# Patient Record
Sex: MALE | Race: WHITE | NOT HISPANIC OR LATINO | Employment: FULL TIME | URBAN - NONMETROPOLITAN AREA
[De-identification: names, ages, dates, MRNs, and addresses within clinical notes are randomized per-mention and may not be internally consistent; named-entity substitution may affect disease eponyms.]

---

## 2023-04-19 ENCOUNTER — APPOINTMENT (EMERGENCY)
Dept: RADIOLOGY | Facility: HOSPITAL | Age: 53
End: 2023-04-19

## 2023-04-19 ENCOUNTER — HOSPITAL ENCOUNTER (EMERGENCY)
Facility: HOSPITAL | Age: 53
Discharge: HOME | End: 2023-04-19
Attending: EMERGENCY MEDICINE | Admitting: EMERGENCY MEDICINE

## 2023-04-19 VITALS
RESPIRATION RATE: 16 BRPM | DIASTOLIC BLOOD PRESSURE: 85 MMHG | SYSTOLIC BLOOD PRESSURE: 129 MMHG | OXYGEN SATURATION: 98 % | WEIGHT: 150 LBS | HEART RATE: 90 BPM | TEMPERATURE: 98 F

## 2023-04-19 DIAGNOSIS — S39.012A STRAIN OF LUMBAR REGION, INITIAL ENCOUNTER: Primary | ICD-10-CM

## 2023-04-19 DIAGNOSIS — M25.462 SWELLING OF JOINT OF LEFT KNEE: ICD-10-CM

## 2023-04-19 RX ORDER — PREDNISONE 20 MG/1
40 TABLET ORAL DAILY
Qty: 8 TABLET | Refills: 0 | Status: SHIPPED | OUTPATIENT
Start: 2023-04-19 | End: 2023-04-23

## 2023-04-19 RX ORDER — PREDNISONE 20 MG/1
40 TABLET ORAL ONCE
Status: COMPLETED | OUTPATIENT
Start: 2023-04-19 | End: 2023-04-19

## 2023-04-19 RX ORDER — LIDOCAINE 50 MG/G
1 PATCH TOPICAL ONCE
Status: DISCONTINUED | OUTPATIENT
Start: 2023-04-19 | End: 2023-04-19 | Stop reason: HOSPADM

## 2023-04-19 RX ORDER — NAPROXEN 500 MG/1
500 TABLET ORAL 2 TIMES DAILY WITH MEALS
Qty: 10 TABLET | Refills: 0 | Status: SHIPPED | OUTPATIENT
Start: 2023-04-19 | End: 2023-04-24

## 2023-04-19 RX ORDER — LIDOCAINE 50 MG/G
1 PATCH TOPICAL DAILY
Qty: 6 PATCH | Refills: 0 | Status: SHIPPED | OUTPATIENT
Start: 2023-04-19 | End: 2023-04-25

## 2023-04-19 RX ORDER — KETOROLAC TROMETHAMINE 30 MG/ML
15 INJECTION, SOLUTION INTRAMUSCULAR; INTRAVENOUS ONCE
Status: COMPLETED | OUTPATIENT
Start: 2023-04-19 | End: 2023-04-19

## 2023-04-19 RX ADMIN — LIDOCAINE 5% 1 PATCH: 700 PATCH TOPICAL at 11:16

## 2023-04-19 RX ADMIN — PREDNISONE 40 MG: 20 TABLET ORAL at 11:16

## 2023-04-19 RX ADMIN — KETOROLAC TROMETHAMINE 15 MG: 30 INJECTION, SOLUTION INTRAMUSCULAR; INTRAVENOUS at 11:17

## 2023-04-19 NOTE — DISCHARGE INSTRUCTIONS
Please avoid any heavy lifting, twisting or turning movements  Follow-up with our sports medicine team, referral has been placed for you  Rest and use ice on your back and knee as needed    Return with any new or worsening symptoms

## 2023-04-19 NOTE — ED PROVIDER NOTES
"History  Chief Complaint   Patient presents with   • Back Pain     Pt reports lower back pain after setting up aquarium for his daughter this weekend  Reports L knee pain at this time as well  No known injury      61-year-old male presents the emergency department for evaluation of back pain  Patient states this started over the weekend when he was lifting a large log to place it in his equal on his aquarium  Reports he felt his back strain while doing this  Since reports this feels like his \"sciatic pain  \"  Which he reports he has had multiple flareups over the past several years  He denies symptoms including fevers, chills, loss of bowel or bladder control, IV drug use, saddle paresthesias or rectal numbness  He denies any urinary symptoms  Pain does not radiate down the legs  Patient also reports left knee swelling  Denies injury to the knee  Reports he might be compensating for the back pain and walking abnormally  Patient states he does not have much pain in the knee but reports the knee as been swollen since Monday  Denies redness or warmth  Denies fevers  History provided by:  Patient  Back Pain  Location:  Lumbar spine and gluteal region  Quality:  Shooting  Pain severity:  Moderate  Onset quality:  Sudden  Timing:  Constant  Progression:  Unchanged  Chronicity:  New  Context: lifting heavy objects    Context comment:  Bending over  Relieved by:  Being still  Worsened by: Movement  Associated symptoms: no abdominal pain, no abdominal swelling, no bladder incontinence, no bowel incontinence, no chest pain, no dysuria, no fever, no headaches, no leg pain, no numbness, no paresthesias, no perianal numbness, no tingling, no weakness and no weight loss    Knee Pain  Associated symptoms: back pain    Associated symptoms: no fever        None       No past medical history on file  No past surgical history on file  No family history on file    I have reviewed and agree with the history as " documented  E-Cigarette/Vaping     E-Cigarette/Vaping Substances     Social History     Tobacco Use   • Smoking status: Every Day     Packs/day: 0 50     Types: Cigarettes   • Smokeless tobacco: Never   Substance Use Topics   • Alcohol use: Not Currently   • Drug use: Not Currently       Review of Systems   Constitutional: Negative for fever and weight loss  Respiratory: Negative  Cardiovascular: Negative  Negative for chest pain  Gastrointestinal: Negative for abdominal pain and bowel incontinence  Genitourinary: Negative for bladder incontinence and dysuria  Musculoskeletal: Positive for arthralgias, back pain and joint swelling  Skin: Negative  Neurological: Negative for tingling, weakness, numbness, headaches and paresthesias  All other systems reviewed and are negative  Physical Exam  Physical Exam  Vitals and nursing note reviewed  Constitutional:       General: He is not in acute distress  Appearance: Normal appearance  He is not ill-appearing, toxic-appearing or diaphoretic  HENT:      Head: Normocephalic  Eyes:      Conjunctiva/sclera: Conjunctivae normal    Abdominal:      Tenderness: There is no right CVA tenderness or left CVA tenderness  Musculoskeletal:      Cervical back: Normal       Thoracic back: Normal         Back:       Left knee: Swelling present  No deformity, erythema, ecchymosis or bony tenderness  Normal range of motion  No tenderness  Comments: paraspinal lumbar and gluteal tenderness  No specific point tenderness or midline tenderness  No step-off deformities  No overlying skin abnormalities   Skin:     General: Skin is warm and dry  Findings: No bruising, erythema or rash  Neurological:      General: No focal deficit present  Mental Status: He is alert  GCS: GCS eye subscore is 4  GCS verbal subscore is 5  GCS motor subscore is 6  Sensory: Sensation is intact  No sensory deficit        Deep Tendon Reflexes:      Reflex Scores:       Patellar reflexes are 2+ on the right side and 2+ on the left side  Psychiatric:         Mood and Affect: Mood normal          Vital Signs  ED Triage Vitals [04/19/23 1052]   Temperature Pulse Respirations Blood Pressure SpO2   98 °F (36 7 °C) 90 16 129/85 98 %      Temp Source Heart Rate Source Patient Position - Orthostatic VS BP Location FiO2 (%)   Temporal -- Lying Right arm --      Pain Score       5           Vitals:    04/19/23 1052   BP: 129/85   Pulse: 90   Patient Position - Orthostatic VS: Lying         Visual Acuity  Visual Acuity    Flowsheet Row Most Recent Value   L Pupil Size (mm) 3   R Pupil Size (mm) 3          ED Medications  Medications   lidocaine (LIDODERM) 5 % patch 1 patch (1 patch Topical Medication Applied 4/19/23 1116)   ketorolac (TORADOL) injection 15 mg (15 mg Intramuscular Given 4/19/23 1117)   predniSONE tablet 40 mg (40 mg Oral Given 4/19/23 1116)       Diagnostic Studies  Results Reviewed     None                 XR knee 1 or 2 views left   Final Result by Jj Giraldo MD (04/19 1209)      No acute osseous abnormality  Workstation performed: [id number]                    Procedures  Procedures         ED Course                               SBIRT 20yo+    Flowsheet Row Most Recent Value   Initial Alcohol Screen: US AUDIT-C     1  How often do you have a drink containing alcohol? 0 Filed at: 04/19/2023 1056   2  How many drinks containing alcohol do you have on a typical day you are drinking? 0 Filed at: 04/19/2023 1056   3a  Male UNDER 65: How often do you have five or more drinks on one occasion? 0 Filed at: 04/19/2023 1056   Audit-C Score 0 Filed at: 04/19/2023 1056   REINALDO: How many times in the past year have you    Used an illegal drug or used a prescription medication for non-medical reasons?  Never Filed at: 04/19/2023 1056                    Medical Decision Making  82-year-old male presented to the emergency department for evaluation of low back pain status post lifting heavy object and bending  Patient also reports left knee swelling  Vitals and medical record reviewed  Differential diagnosis included but not limited to sciatica, back spasm, back strain, fracture, ligament injury, septic joint  On exam patient had paralumbar back tenderness  No overlying skin abnormalities  No midline or point tenderness  Step-off deformities  History and physical exam were consistent with sciatica and patient was treated as such  Patient's left knee was swollen  There is no redness or warmth  Patient is afebrile  Low concern for septic joint  Normal range of motion  ED interpretation of x-ray was negative for acute osseous injury  Concern for fracture  Treated with an Ace wrap and will follow-up with sports medicine  Return precautions were discussed and he verbalized understanding  Patient was clinically hemodynamically stable for discharge    Strain of lumbar region, initial encounter: acute illness or injury  Swelling of joint of left knee: acute illness or injury  Amount and/or Complexity of Data Reviewed  Radiology: ordered  Risk  Prescription drug management  Disposition  Final diagnoses:   Strain of lumbar region, initial encounter   Swelling of joint of left knee     Time reflects when diagnosis was documented in both MDM as applicable and the Disposition within this note     Time User Action Codes Description Comment    4/19/2023 11:49 AM Norma Sandoval Add [S39 012A] Strain of lumbar region, initial encounter     4/19/2023 11:49 AM Norma Sandoval Add [M25 462] Swelling of joint of left knee       ED Disposition     ED Disposition   Discharge    Condition   Stable    Date/Time   Wed Apr 19, 2023 11:51 AM    Comment   Santos Regalado discharge to home/self care                 Follow-up Information     Follow up With Specialties Details [address], DO    [address] [facility] [address] [address] [address]  [address]  [address] [place] [address]  [phone]            Discharge Medication List as of 4/19/2023 11:51 AM      START taking these medications    Details   lidocaine (Lidoderm) 5 % Apply 1 patch topically over 12 hours daily for 6 days Remove & Discard patch within 12 hours or as directed by MD, Starting Wed 4/19/2023, Until Tue 4/25/2023, Normal      naproxen (Naprosyn) 500 mg tablet Take 1 tablet (500 mg total) by mouth 2 (two) times a day with meals for 5 days, Starting Wed 4/19/2023, Until Mon 4/24/2023, Normal      predniSONE 20 mg tablet Take 2 tablets (40 mg total) by mouth daily for 4 days, Starting Wed 4/19/2023, Until Sun 4/23/2023, Normal                 PDMP Review     None          ED Provider  Electronically Signed by           Caleb Silva PA-C  04/19/23 3409

## 2023-04-19 NOTE — Clinical Note
Rocaeltracie Navarro was seen and treated in our emergency department on 4/19/2023  Diagnosis:     Glenna Vyas  may return to work on return date  He may return on this date: 04/21/2023         If you have any questions or concerns, please don't hesitate to call        Gissel Sal PA-C    ______________________________           _______________          _______________  Hospital Representative                              Date                                Time

## 2023-04-21 VITALS
SYSTOLIC BLOOD PRESSURE: 122 MMHG | DIASTOLIC BLOOD PRESSURE: 85 MMHG | HEIGHT: 70 IN | HEART RATE: 83 BPM | TEMPERATURE: 97.6 F | BODY MASS INDEX: 19.9 KG/M2 | WEIGHT: 139 LBS

## 2023-04-21 DIAGNOSIS — M25.562 ACUTE PAIN OF LEFT KNEE: Primary | ICD-10-CM

## 2023-04-21 DIAGNOSIS — M25.662 KNEE STIFFNESS, LEFT: ICD-10-CM

## 2023-04-21 DIAGNOSIS — M25.462 SWELLING OF JOINT OF LEFT KNEE: ICD-10-CM

## 2023-04-21 NOTE — LETTER
April 25, 2023     Elbret Jaramillo PA-C  [address] [place] [address]    Patient: Kelly Disla   YOB: 1970   Date of Visit: 4/21/2023       Dear Dr Vernadine Gottron: Thank you for referring Bassam Oliva to me for evaluation  Below are my notes for this consultation  If you have questions, please do not hesitate to call me  I look forward to following your patient along with you  Sincerely,        Willy Lund MD        CC: No Recipients  Willy Lund MD  4/25/2023  9:06 AM  Signed  1  Acute pain of left knee  Ambulatory Referral to Physical Therapy      2  Swelling of joint of left knee  Ambulatory Referral to Sports Medicine    Ambulatory Referral to Physical Therapy      3  Knee stiffness, left  Ambulatory Referral to Physical Therapy        Orders Placed This Encounter   Procedures   • Ambulatory Referral to Physical Therapy        Imaging Studies (I personally reviewed images in PACS and report):    • X-ray left knee 4/19/2023: No acute osseous abnormalities  No joint effusion  No significant degenerative changes  IMPRESSION:  • Acute atraumatic left knee pain/stiffness  • Pain mainly localized over the posterolateral aspect of his knee over his biceps femoris/lateral gastroc  • Radiographic imaging unremarkable  • Suspected myofascial pain/tightness    PLAN:    • Clinical exam and radiographic imaging reviewed with patient today, with impression as per above  I have discussed with the patient the pathophysiology of this diagnosis and reviewed how the examination correlates with this diagnosis  • Prior imaging reviewed with patient today as noted above  • Recommended conservative treatment at this time and starting formal physical therapy which she was agreeable    Referral was placed today and recommended a minimum of 4 to 6 weeks before considering to transition to a home exercise program  • Regards to pain control recommend as needed use of acetaminophen, "NSAIDs, heat/ice therapy torments on/off  Return in about 6 weeks (around 6/2/2023)  There are no Patient Instructions on file for this visit  Portions of the record may have been created with voice recognition software  Occasional wrong word or \"sound a like\" substitutions may have occurred due to the inherent limitations of voice recognition software  Read the chart carefully and recognize, using context, where substitutions have occurred  CHIEF COMPLAINT:  Chief Complaint   Patient presents with   • Left Knee - Pain, Swelling         HPI:  Ruiz Quiñones is a 50 y o  male  who presents for       Visit 4/21/2023 :  Initial evaluation of left knee pain:  Patient reports ongoing issue for the past couple weeks after setting up an aquarium and lifting heavy objects  He had first was developing left lateral knee pain and eventually caused paralumbar back pain as well  He is primarily here today for his left knee pain as his back pain has been improving  He states the pain of his knee is over the posterolateral aspect and is nonradiating  He describes as a tight/aching pain of mild to moderate intensity  He states the pain is intermittent and he cannot specifically find a pattern of aggravation other than when he transitions from sitting to standing to get out of his car  He also reports a sense of stiffness of his knee and difficulty with extending his knee fully without a sense of tightness/restriction  He states there was initial swelling of his knee which has receded  He denies any knee discoloration, numbness/tingling, crepitus  He denies any sensation of giving out or locking in extension of his knee  He is able to tolerate weightbearing on his left lower extremity despite this pain  He is not taking any pain medications for this issue  He does not use any heat or ice  He states he does home stretches on his own but has not seen formal physical therapy for his knee before    He denies " "prior surgeries of his knee in the past             Medical, Surgical, Family, and Social History    History reviewed  No pertinent past medical history  Past Surgical History:   Procedure Laterality Date   • HERNIA REPAIR       Social History   Social History     Substance and Sexual Activity   Alcohol Use Not Currently     Social History     Substance and Sexual Activity   Drug Use Not Currently     Social History     Tobacco Use   Smoking Status Every Day   • Packs/day: 0 50   • Types: Cigarettes   Smokeless Tobacco Never     History reviewed  No pertinent family history  No Known Allergies       Physical Exam  /85 (BP Location: Left arm)   Pulse 83   Temp 97 6 °F (36 4 °C) (Temporal)   Ht 5' 10\" (1 778 m)   Wt 63 kg (139 lb)   BMI 19 94 kg/m²     Constitutional:  see vital signs  Gen: well-developed, normocephalic/atraumatic, well-groomed  Eyes: No inflammation or discharge of conjunctiva or lids; sclera clear   Pharynx: no inflammation, lesion, or mass of lips  Neck: supple, no masses, non-distended  MSK: no inflammation, lesion, mass, or clubbing of nails and digits except for other than mentioned below  SKIN: no visible rashes or skin lesions  Pulmonary/Chest: Effort normal  No respiratory distress     NEURO: cranial nerves grossly intact  PSYCH:  Alert and oriented to person, place, and time; recent and remote memory intact; mood normal, no depression, anxiety, or agitation, judgment and insight good and intact     Ortho Exam  Left Knee Exam:  Erythema: no  Swelling: no  Increased Warmth: no  Tenderness: none  ROM: 0-130 (reports tightness/aching of posterolateral knee when passive extension within the last 10 degrees)   Knee flexion strength: 5/5  Knee extension strength: 5/5  Patellar Apprehension: negative  Patellar Grind: negative  Lachman's: negative  Anterior Drawer: negative:  Varus laxity: negative  Valgus laxity: negative  [facility]: negative   Thessaly Test: negative  Dial Test: " negative      LE NV Exam: +2 DP/PT pulses   Sensation intact to light touch      Left hip ROM demonstrates no pain actively or passively    No calf tenderness to palpation     Gait: normal w/o antalgia      Procedures

## 2023-04-21 NOTE — PROGRESS NOTES
"1  Acute pain of left knee  Ambulatory Referral to Physical Therapy      2  Swelling of joint of left knee  Ambulatory Referral to Sports Medicine    Ambulatory Referral to Physical Therapy      3  Knee stiffness, left  Ambulatory Referral to Physical Therapy        Orders Placed This Encounter   Procedures   • Ambulatory Referral to Physical Therapy        Imaging Studies (I personally reviewed images in PACS and report):    • X-ray left knee 4/19/2023: No acute osseous abnormalities  No joint effusion  No significant degenerative changes  IMPRESSION:  • Acute atraumatic left knee pain/stiffness  • Pain mainly localized over the posterolateral aspect of his knee over his biceps femoris/lateral gastroc  • Radiographic imaging unremarkable  • Suspected myofascial pain/tightness    PLAN:    • Clinical exam and radiographic imaging reviewed with patient today, with impression as per above  I have discussed with the patient the pathophysiology of this diagnosis and reviewed how the examination correlates with this diagnosis  • Prior imaging reviewed with patient today as noted above  • Recommended conservative treatment at this time and starting formal physical therapy which she was agreeable  Referral was placed today and recommended a minimum of 4 to 6 weeks before considering to transition to a home exercise program  • Regards to pain control recommend as needed use of acetaminophen, NSAIDs, heat/ice therapy torments on/off  Return in about 6 weeks (around 6/2/2023)  There are no Patient Instructions on file for this visit  Portions of the record may have been created with voice recognition software  Occasional wrong word or \"sound a like\" substitutions may have occurred due to the inherent limitations of voice recognition software  Read the chart carefully and recognize, using context, where substitutions have occurred       CHIEF COMPLAINT:  Chief Complaint   Patient presents with   • Left Knee - " Pain, Swelling         HPI:  Kunal Ivey is a 50 y o  male  who presents for       Visit 4/21/2023 :  Initial evaluation of left knee pain:  Patient reports ongoing issue for the past couple weeks after setting up an aquarium and lifting heavy objects  He had first was developing left lateral knee pain and eventually caused paralumbar back pain as well  He is primarily here today for his left knee pain as his back pain has been improving  He states the pain of his knee is over the posterolateral aspect and is nonradiating  He describes as a tight/aching pain of mild to moderate intensity  He states the pain is intermittent and he cannot specifically find a pattern of aggravation other than when he transitions from sitting to standing to get out of his car  He also reports a sense of stiffness of his knee and difficulty with extending his knee fully without a sense of tightness/restriction  He states there was initial swelling of his knee which has receded  He denies any knee discoloration, numbness/tingling, crepitus  He denies any sensation of giving out or locking in extension of his knee  He is able to tolerate weightbearing on his left lower extremity despite this pain  He is not taking any pain medications for this issue  He does not use any heat or ice  He states he does home stretches on his own but has not seen formal physical therapy for his knee before  He denies prior surgeries of his knee in the past             Medical, Surgical, Family, and Social History    History reviewed  No pertinent past medical history    Past Surgical History:   Procedure Laterality Date   • HERNIA REPAIR       Social History   Social History     Substance and Sexual Activity   Alcohol Use Not Currently     Social History     Substance and Sexual Activity   Drug Use Not Currently     Social History     Tobacco Use   Smoking Status Every Day   • Packs/day: 0 50   • Types: Cigarettes   Smokeless Tobacco Never "    History reviewed  No pertinent family history  No Known Allergies       Physical Exam  /85 (BP Location: Left arm)   Pulse 83   Temp 97 6 °F (36 4 °C) (Temporal)   Ht 5' 10\" (1 778 m)   Wt 63 kg (139 lb)   BMI 19 94 kg/m²     Constitutional:  see vital signs  Gen: well-developed, normocephalic/atraumatic, well-groomed  Eyes: No inflammation or discharge of conjunctiva or lids; sclera clear   Pharynx: no inflammation, lesion, or mass of lips  Neck: supple, no masses, non-distended  MSK: no inflammation, lesion, mass, or clubbing of nails and digits except for other than mentioned below  SKIN: no visible rashes or skin lesions  Pulmonary/Chest: Effort normal  No respiratory distress     NEURO: cranial nerves grossly intact  PSYCH:  Alert and oriented to person, place, and time; recent and remote memory intact; mood normal, no depression, anxiety, or agitation, judgment and insight good and intact     Ortho Exam  Left Knee Exam:  Erythema: no  Swelling: no  Increased Warmth: no  Tenderness: none  ROM: 0-130 (reports tightness/aching of posterolateral knee when passive extension within the last 10 degrees)   Knee flexion strength: 5/5  Knee extension strength: 5/5  Patellar Apprehension: negative  Patellar Grind: negative  Lachman's: negative  Anterior Drawer: negative:  Varus laxity: negative  Valgus laxity: negative  [facility]: negative   Thessaly Test: negative  Dial Test: negative      LE NV Exam: +2 DP/PT pulses   Sensation intact to light touch      Left hip ROM demonstrates no pain actively or passively    No calf tenderness to palpation     Gait: normal w/o antalgia      Procedures        "

## 2023-11-02 ENCOUNTER — APPOINTMENT (EMERGENCY)
Dept: RADIOLOGY | Facility: HOSPITAL | Age: 53
End: 2023-11-02
Payer: COMMERCIAL

## 2023-11-02 ENCOUNTER — HOSPITAL ENCOUNTER (EMERGENCY)
Facility: HOSPITAL | Age: 53
Discharge: HOME | End: 2023-11-02
Attending: EMERGENCY MEDICINE
Payer: COMMERCIAL

## 2023-11-02 VITALS
BODY MASS INDEX: 18.48 KG/M2 | RESPIRATION RATE: 16 BRPM | HEART RATE: 86 BPM | DIASTOLIC BLOOD PRESSURE: 65 MMHG | TEMPERATURE: 97.7 F | WEIGHT: 132 LBS | HEIGHT: 71 IN | SYSTOLIC BLOOD PRESSURE: 127 MMHG | OXYGEN SATURATION: 98 %

## 2023-11-02 DIAGNOSIS — M25.461 EFFUSION OF RIGHT KNEE JOINT: Primary | ICD-10-CM

## 2023-11-02 PROCEDURE — 99283 EMERGENCY DEPT VISIT LOW MDM: CPT

## 2023-11-02 PROCEDURE — 73564 X-RAY EXAM KNEE 4 OR MORE: CPT

## 2023-11-02 PROCEDURE — 99284 EMERGENCY DEPT VISIT MOD MDM: CPT | Performed by: EMERGENCY MEDICINE

## 2023-11-02 NOTE — Clinical Note
Nayla Sen was seen and treated in our emergency department on 11/2/2023. Diagnosis:     Jj Vega  may return to work on return date. He may return on this date: 11/06/2023         If you have any questions or concerns, please don't hesitate to call.       Jose M Hobson, DO    ______________________________           _______________          _______________  Hospital Representative                              Date                                Time

## 2023-11-02 NOTE — DISCHARGE INSTRUCTIONS
Try to use the Ace wrap until the swelling is relieved. Use ice 4-6 times a day for 15 to 20 minutes at a time. Return with any worsening. Follow-up with sports medicine as discussed. Your imaging studies have been preliminarily reviewed by the emergency department. Further review by Radiology is pending at this time. If there is a discrepancy or a finding of additional concern identified, we will attempt to contact you at the number you have provided us. If you do not hear from us, follow-up with your primary care provider within 1-2 weeks is always recommended to ensure that all findings were normal or as initially reported. Your results may also be available on [email or web address]    Please also note that sometimes there are subtle abnormalities in your lab values that you may observe when you access your record online. These are frequently not worrisome and if they are of concern we will have discussed them with you. However, we always encourage that you discuss any concerns you may have or observe on your record with your primary care provider. Please also be aware that voice transcription will occasionally recognize words or grammar differently than what was spoken.

## 2023-11-02 NOTE — ED PROVIDER NOTES
History  Chief Complaint   Patient presents with    Knee Pain     Pt. Voiced right knee pain that has been going on all week      61-year-old male to the emergency room complaining of right knee pain. Reports that he has had swelling ongoing for about 1 week. Denies any of trauma. Denies any gout. No fevers or chills. No chest pain or shortness of breath. No swelling in the calf. No swelling in the right thigh. History of DVT. History provided by:  Patient  Knee Pain  Location:  Knee  Injury: no    Knee location:  R knee  Pain details:     Quality:  Aching and pressure    Radiates to:  Does not radiate  Worsened by:  Bearing weight  Ineffective treatments:  None tried  Associated symptoms: swelling    Associated symptoms: no back pain, no decreased ROM, no fatigue, no fever, no neck pain, no numbness, no stiffness and no tingling        None       History reviewed. No pertinent past medical history. Past Surgical History:   Procedure Laterality Date    HERNIA REPAIR         History reviewed. No pertinent family history. I have reviewed and agree with the history as documented. E-Cigarette/Vaping     E-Cigarette/Vaping Substances     Social History     Tobacco Use    Smoking status: Every Day     Packs/day: 0.50     Types: Cigarettes    Smokeless tobacco: Never   Substance Use Topics    Alcohol use: Not Currently    Drug use: Not Currently       Review of Systems   Constitutional:  Negative for fatigue and fever. Respiratory: Negative. Cardiovascular: Negative. Gastrointestinal: Negative. Musculoskeletal:  Positive for arthralgias, gait problem and joint swelling. Negative for back pain, neck pain and stiffness. Physical Exam  Physical Exam  Vitals and nursing note reviewed. Constitutional:       General: He is not in acute distress. Appearance: He is normal weight. He is not ill-appearing or toxic-appearing. HENT:      Head: Normocephalic and atraumatic.       Right Ear: External ear normal.      Left Ear: External ear normal.      Nose: Nose normal.      Mouth/Throat:      Mouth: Mucous membranes are moist.   Pulmonary:      Effort: Pulmonary effort is normal. No respiratory distress. Abdominal:      General: Abdomen is flat. There is no distension. Musculoskeletal:         General: Swelling, tenderness and signs of injury present. Left knee: Swelling and effusion present. Decreased range of motion. Tenderness present over the medial joint line and lateral joint line. Skin:     Coloration: Skin is not pale. Neurological:      General: No focal deficit present. Mental Status: He is alert. Psychiatric:         Mood and Affect: Mood normal.         Thought Content: Thought content normal.         Judgment: Judgment normal.         Vital Signs  ED Triage Vitals [11/02/23 1358]   Temperature Pulse Respirations Blood Pressure SpO2   97.7 °F (36.5 °C) 86 16 127/65 98 %      Temp Source Heart Rate Source Patient Position - Orthostatic VS BP Location FiO2 (%)   Temporal Monitor Sitting Left arm --      Pain Score       2           Vitals:    11/02/23 1358   BP: 127/65   Pulse: 86   Patient Position - Orthostatic VS: Sitting         Visual Acuity      ED Medications  Medications - No data to display    Diagnostic Studies  Results Reviewed       None                   XR knee 4+ vw right injury   ED Interpretation by Lucy Reynolds DO (11/02 1434)   NAD                 Procedures  Procedures         ED Course  ED Course as of 11/02/23 1440   Thu Nov 02, 2023   1434 Patient was offered arthrocentesis and injection of steroids but he declined, preferring to follow-up with sports medicine and orthopedics. Medical Decision Making  Patient presented to the emergency department and a MSE was performed. The patient was evaluated for complaint related to acute right knee pain.   Patient is potentially at risk for, but not limited to, strain, sprain, fracture, dislocation or ligamentous disruption. Several of these diagnoses have been evaluated and ruled out by history and physical.  As needed, patient will be further evaluated with laboratory and imaging studies. Higher level diagnostics, such as CT imaging or ultrasound, may also be required. Please see work-up portion of the note for further evaluation of patient's risk. Socioeconomic factors were also considered as part of the decision-making process. Unless otherwise stated in the chart or patient is admitted as elsewhere documented, any previously prescribed medications will be maintained. Problems Addressed:  Effusion of right knee joint: chronic illness or injury with exacerbation, progression, or side effects of treatment     Details: Patient was offered arthrocentesis. Declined. Will provide rice therapy instead and follow-up with sports medicine. Amount and/or Complexity of Data Reviewed  Radiology: ordered. Disposition  Final diagnoses:   Effusion of right knee joint     Time reflects when diagnosis was documented in both MDM as applicable and the Disposition within this note       Time User Action Codes Description Comment    11/2/2023  2:38 PM Tahir Tellez Add [F11.047] Effusion of right knee joint           ED Disposition       ED Disposition   Discharge    Condition   Stable    Date/Time   Thu Nov 2, 2023  2:37 PM    Comment   Navneet Cespedes discharge to home/self care.                    Follow-up Information    None         Patient's Medications   Discharge Prescriptions    No medications on file           PDMP Review       None            ED Provider  Electronically Signed by             Craig James DO  11/02/23 6707

## 2023-12-19 ENCOUNTER — APPOINTMENT (EMERGENCY)
Dept: RADIOLOGY | Facility: HOSPITAL | Age: 53
End: 2023-12-19
Payer: COMMERCIAL

## 2023-12-19 ENCOUNTER — HOSPITAL ENCOUNTER (EMERGENCY)
Facility: HOSPITAL | Age: 53
Discharge: HOME | End: 2023-12-19
Attending: EMERGENCY MEDICINE
Payer: COMMERCIAL

## 2023-12-19 VITALS
TEMPERATURE: 102.1 F | DIASTOLIC BLOOD PRESSURE: 67 MMHG | HEART RATE: 108 BPM | SYSTOLIC BLOOD PRESSURE: 131 MMHG | RESPIRATION RATE: 16 BRPM | WEIGHT: 130 LBS | OXYGEN SATURATION: 98 % | BODY MASS INDEX: 18.13 KG/M2

## 2023-12-19 DIAGNOSIS — J10.1 INFLUENZA A: ICD-10-CM

## 2023-12-19 DIAGNOSIS — R50.9 FEVER: ICD-10-CM

## 2023-12-19 DIAGNOSIS — J18.9 PNEUMONIA: Primary | ICD-10-CM

## 2023-12-19 LAB
FLUAV RNA RESP QL NAA+PROBE: POSITIVE
FLUBV RNA RESP QL NAA+PROBE: NEGATIVE
RSV RNA RESP QL NAA+PROBE: NEGATIVE
SARS-COV-2 RNA RESP QL NAA+PROBE: NEGATIVE

## 2023-12-19 PROCEDURE — 0241U HB NFCT DS VIR RESP RNA 4 TRGT: CPT | Performed by: EMERGENCY MEDICINE

## 2023-12-19 PROCEDURE — 71045 X-RAY EXAM CHEST 1 VIEW: CPT

## 2023-12-19 PROCEDURE — 99284 EMERGENCY DEPT VISIT MOD MDM: CPT

## 2023-12-19 PROCEDURE — 99284 EMERGENCY DEPT VISIT MOD MDM: CPT | Performed by: EMERGENCY MEDICINE

## 2023-12-19 RX ORDER — IBUPROFEN 600 MG/1
600 TABLET ORAL ONCE
Status: COMPLETED | OUTPATIENT
Start: 2023-12-19 | End: 2023-12-19

## 2023-12-19 RX ORDER — ACETAMINOPHEN 325 MG/1
975 TABLET ORAL ONCE
Status: COMPLETED | OUTPATIENT
Start: 2023-12-19 | End: 2023-12-19

## 2023-12-19 RX ORDER — DOXYCYCLINE HYCLATE 100 MG/1
100 CAPSULE ORAL ONCE
Status: COMPLETED | OUTPATIENT
Start: 2023-12-19 | End: 2023-12-19

## 2023-12-19 RX ORDER — DOXYCYCLINE HYCLATE 100 MG/1
100 CAPSULE ORAL 2 TIMES DAILY
Qty: 20 CAPSULE | Refills: 0 | Status: SHIPPED | OUTPATIENT
Start: 2023-12-19 | End: 2023-12-29

## 2023-12-19 RX ORDER — IBUPROFEN 600 MG/1
600 TABLET ORAL ONCE
Status: DISCONTINUED | OUTPATIENT
Start: 2023-12-19 | End: 2023-12-19

## 2023-12-19 RX ORDER — ACETAMINOPHEN 325 MG/1
975 TABLET ORAL ONCE
Status: DISCONTINUED | OUTPATIENT
Start: 2023-12-19 | End: 2023-12-19

## 2023-12-19 RX ADMIN — IBUPROFEN 600 MG: 600 TABLET ORAL at 20:54

## 2023-12-19 RX ADMIN — ACETAMINOPHEN 975 MG: 325 TABLET, FILM COATED ORAL at 20:54

## 2023-12-19 RX ADMIN — DOXYCYCLINE 100 MG: 100 CAPSULE ORAL at 21:20

## 2023-12-19 NOTE — Clinical Note
Conor Greenberg was seen and treated in our emergency department on 12/19/2023.            Off work today and tomorrow    Diagnosis:     Conor  .    He may return on this date:          If you have any questions or concerns, please don't hesitate to call.      Sukh Muller, DO    ______________________________           _______________          _______________  Hospital Representative                              Date                                Time

## 2023-12-20 NOTE — ED PROVIDER NOTES
History  Chief Complaint   Patient presents with    Fever     Patient reports he thinks he started w/ fever last PM and had it throughout day. Also started w/ cough 3-4 days ago. Denies taking tylenol or motrin.      Patient is a 53-year-old male presents emergency department complaining of fever started yesterday afternoon associated with cough no shortness of breath or chest pain no abdominal pain nausea or vomiting has had mild congestion.  Patient given Tylenol in the ED on arrival and Motrin and now feeling better.        History provided by:  Patient  Fever  Associated symptoms: congestion, cough and fever    Associated symptoms: no abdominal pain, no chest pain, no diarrhea, no ear pain, no fatigue, no headaches, no myalgias, no nausea, no rash, no rhinorrhea, no shortness of breath, no sore throat, no vomiting and no wheezing        None       History reviewed. No pertinent past medical history.    Past Surgical History:   Procedure Laterality Date    HERNIA REPAIR         History reviewed. No pertinent family history.  I have reviewed and agree with the history as documented.    E-Cigarette/Vaping    E-Cigarette Use Never User      E-Cigarette/Vaping Substances     Social History     Tobacco Use    Smoking status: Every Day     Current packs/day: 0.50     Types: Cigarettes    Smokeless tobacco: Never   Vaping Use    Vaping status: Never Used   Substance Use Topics    Alcohol use: Not Currently    Drug use: Not Currently       Review of Systems   Constitutional:  Positive for fever. Negative for activity change, appetite change, chills and fatigue.   HENT:  Positive for congestion. Negative for ear pain, rhinorrhea and sore throat.    Eyes:  Negative for discharge, redness and visual disturbance.   Respiratory:  Positive for cough. Negative for chest tightness, shortness of breath and wheezing.    Cardiovascular:  Negative for chest pain and palpitations.   Gastrointestinal:  Negative for abdominal pain,  constipation, diarrhea, nausea and vomiting.   Endocrine: Negative for polydipsia and polyuria.   Genitourinary:  Negative for difficulty urinating, dysuria, frequency, hematuria and urgency.   Musculoskeletal:  Negative for arthralgias and myalgias.   Skin:  Negative for color change, pallor and rash.   Neurological:  Negative for dizziness, weakness, light-headedness, numbness and headaches.   Hematological:  Negative for adenopathy. Does not bruise/bleed easily.   All other systems reviewed and are negative.      Physical Exam  Physical Exam  Vitals and nursing note reviewed.   Constitutional:       Appearance: He is well-developed.   HENT:      Head: Normocephalic and atraumatic.      Right Ear: External ear normal.      Left Ear: External ear normal.      Nose: Congestion present.   Eyes:      Conjunctiva/sclera: Conjunctivae normal.      Pupils: Pupils are equal, round, and reactive to light.   Cardiovascular:      Rate and Rhythm: Normal rate and regular rhythm.      Heart sounds: Normal heart sounds.   Pulmonary:      Effort: Pulmonary effort is normal. No respiratory distress.      Breath sounds: Normal breath sounds. No wheezing or rales.   Chest:      Chest wall: No tenderness.   Abdominal:      General: Bowel sounds are normal. There is no distension.      Palpations: Abdomen is soft.      Tenderness: There is no abdominal tenderness. There is no guarding.   Musculoskeletal:         General: Normal range of motion.      Cervical back: Normal range of motion and neck supple.   Skin:     General: Skin is warm and dry.   Neurological:      Mental Status: He is alert and oriented to person, place, and time.      Cranial Nerves: No cranial nerve deficit.      Sensory: No sensory deficit.         Vital Signs  ED Triage Vitals [12/19/23 2043]   Temperature Pulse Respirations Blood Pressure SpO2   (!) 102.1 °F (38.9 °C) (!) 108 16 131/67 98 %      Temp Source Heart Rate Source Patient Position - Orthostatic VS  BP Location FiO2 (%)   Oral -- Sitting Left arm --      Pain Score       7           Vitals:    12/19/23 2043   BP: 131/67   Pulse: (!) 108   Patient Position - Orthostatic VS: Sitting         Visual Acuity      ED Medications  Medications   acetaminophen (TYLENOL) tablet 975 mg (975 mg Oral Given 12/19/23 2054)   ibuprofen (MOTRIN) tablet 600 mg (600 mg Oral Given 12/19/23 2054)   doxycycline hyclate (VIBRAMYCIN) capsule 100 mg (100 mg Oral Given 12/19/23 2120)       Diagnostic Studies  Results Reviewed       Procedure Component Value Units Date/Time    FLU/RSV/COVID - if FLU/RSV clinically relevant [[id number]]  (Abnormal) Collected: 12/19/23 2045    Lab Status: Final result Specimen: Nares from Nose Updated: 12/19/23 2136     SARS-CoV-2 Negative     INFLUENZA A PCR Positive     INFLUENZA B PCR Negative     RSV PCR Negative    Narrative:      FOR PEDIATRIC PATIENTS - copy/paste COVID Guidelines URL to browser: [email or web address]    SARS-CoV-2 assay is a Nucleic Acid Amplification assay intended for the  qualitative detection of nucleic acid from SARS-CoV-2 in nasopharyngeal  swabs. Results are for the presumptive identification of SARS-CoV-2 RNA.    Positive results are indicative of infection with SARS-CoV-2, the virus  causing COVID-19, but do not rule out bacterial infection or co-infection  with other viruses. Laboratories within the [place] and its  territories are required to report all positive results to the appropriate  public health authorities. Negative results do not preclude SARS-CoV-2  infection and should not be used as the sole basis for treatment or other  patient management decisions. Negative results must be combined with  clinical observations, patient history, and epidemiological information.  This test has not been FDA cleared or approved.    This test has been authorized by FDA under an Emergency Use Authorization  (EUA). This test  is only authorized for the duration of time the  declaration that circumstances exist justifying the authorization of the  emergency use of an in vitro diagnostic tests for detection of SARS-CoV-2  virus and/or diagnosis of COVID-19 infection under section 564(b)(1) of  the Act, 21 U.S.C. 360bbb-3(b)(1), unless the authorization is terminated  or revoked sooner. The test has been validated but independent review by FDA  and CLIA is pending.    Test performed using [employer]: This RT-PCR assay targets N2,  a region unique to SARS-CoV-2. A conserved region in the E-gene was chosen  for pan-Sarbecovirus detection which includes SARS-CoV-2.    According to CMS-2020-01-R, this platform meets the definition of high-throughput technology.                   XR chest 1 view portable   ED Interpretation by Sukh Muller DO (12/19 2118)   Bibasilar atelectasis versus early infiltrates                 Procedures  Procedures         ED Course                               SBIRT 20yo+      Flowsheet Row Most Recent Value   Initial Alcohol Screen: US AUDIT-C     1. How often do you have a drink containing alcohol? 0 Filed at: 12/19/2023 2046   2. How many drinks containing alcohol do you have on a typical day you are drinking?  0 Filed at: 12/19/2023 2046   3a. Male UNDER 65: How often do you have five or more drinks on one occasion? 0 Filed at: 12/19/2023 2046   3b. FEMALE Any Age, or MALE 65+: How often do you have 4 or more drinks on one occassion? 0 Filed at: 12/19/2023 2046   Audit-C Score 0 Filed at: 12/19/2023 2046   REINALDO: How many times in the past year have you...    Used an illegal drug or used a prescription medication for non-medical reasons? Never Filed at: 12/19/2023 2046                      Medical Decision Making  Patient remained clinically and hemodynamically stable in the emergency department normal O2 saturation on room air no respiratory distress.  X-ray reveals bibasilar atelectasis concern for  early pneumonia versus bronchitis clinically for now we will treat with oral antibiotics advised fever control supportive care rest plenty of fluids and prompt follow-up with primary physician for further evaluation and treatment and obtain test results. return precautions and anticipatory guidance discussed.      Problems Addressed:  Fever: acute illness or injury  Pneumonia: acute illness or injury    Amount and/or Complexity of Data Reviewed  External Data Reviewed: notes.  Labs: ordered. Decision-making details documented in ED Course.  Radiology: ordered and independent interpretation performed. Decision-making details documented in ED Course.    Risk  OTC drugs.  Prescription drug management.             Disposition  Final diagnoses:   Pneumonia   Fever   Influenza A     Time reflects when diagnosis was documented in both MDM as applicable and the Disposition within this note       Time User Action Codes Description Comment    12/19/2023  9:18 PM Sukh Muller Add [J18.9] Pneumonia     12/19/2023  9:18 PM Sukh Muller Add [R50.9] Fever     12/19/2023  9:40 PM Sukh Muller [J10.1] Influenza A           ED Disposition       ED Disposition   Discharge    Condition   Stable    Date/Time   Tue Dec 19, 2023 2118    Comment   Conor Greenberg discharge to home/self care.                   Follow-up Information       Follow up With Specialties Details Why Contact Info    Adrian Umanzor DO  Schedule an appointment as soon as possible for a visit in 3 days  [address] [place] [id number]  [phone]              Discharge Medication List as of 12/19/2023  9:19 PM        START taking these medications    Details   doxycycline hyclate (VIBRAMYCIN) 100 mg capsule Take 1 capsule (100 mg total) by mouth 2 (two) times a day for 10 days, Starting Tue 12/19/2023, Until Fri 12/29/2023, Normal             No discharge procedures on file.    PDMP Review       None            ED Provider  Electronically  Signed by             Sukh Muller, DO  12/19/23 4070

## 2023-12-29 ENCOUNTER — HOSPITAL ENCOUNTER (EMERGENCY)
Facility: HOSPITAL | Age: 53
Discharge: HOME | End: 2023-12-29
Attending: STUDENT IN AN ORGANIZED HEALTH CARE EDUCATION/TRAINING PROGRAM
Payer: COMMERCIAL

## 2023-12-29 ENCOUNTER — APPOINTMENT (EMERGENCY)
Dept: RADIOLOGY | Facility: HOSPITAL | Age: 53
End: 2023-12-29
Payer: COMMERCIAL

## 2023-12-29 VITALS
DIASTOLIC BLOOD PRESSURE: 78 MMHG | HEIGHT: 71 IN | SYSTOLIC BLOOD PRESSURE: 135 MMHG | OXYGEN SATURATION: 98 % | TEMPERATURE: 98.1 F | BODY MASS INDEX: 18.2 KG/M2 | WEIGHT: 130 LBS | HEART RATE: 81 BPM | RESPIRATION RATE: 18 BRPM

## 2023-12-29 DIAGNOSIS — R53.1 GENERALIZED WEAKNESS: Primary | ICD-10-CM

## 2023-12-29 LAB
ALBUMIN SERPL BCP-MCNC: 4 G/DL (ref 3.5–5)
ALP SERPL-CCNC: 78 U/L (ref 34–104)
ALT SERPL W P-5'-P-CCNC: 26 U/L (ref 7–52)
ANION GAP SERPL CALCULATED.3IONS-SCNC: 3 MMOL/L
AST SERPL W P-5'-P-CCNC: 19 U/L (ref 13–39)
BASOPHILS # BLD AUTO: 0.06 THOUSANDS/ÂΜL (ref 0–0.1)
BASOPHILS NFR BLD AUTO: 1 % (ref 0–1)
BILIRUB SERPL-MCNC: 0.69 MG/DL (ref 0.2–1)
BUN SERPL-MCNC: 13 MG/DL (ref 5–25)
CALCIUM SERPL-MCNC: 8.8 MG/DL (ref 8.4–10.2)
CARDIAC TROPONIN I PNL SERPL HS: 6 NG/L
CHLORIDE SERPL-SCNC: 108 MMOL/L (ref 96–108)
CO2 SERPL-SCNC: 27 MMOL/L (ref 21–32)
CREAT SERPL-MCNC: 0.78 MG/DL (ref 0.6–1.3)
EOSINOPHIL # BLD AUTO: 0.21 THOUSAND/ÂΜL (ref 0–0.61)
EOSINOPHIL NFR BLD AUTO: 2 % (ref 0–6)
ERYTHROCYTE [DISTWIDTH] IN BLOOD BY AUTOMATED COUNT: 12.6 % (ref 11.6–15.1)
FLUAV RNA RESP QL NAA+PROBE: NEGATIVE
FLUBV RNA RESP QL NAA+PROBE: NEGATIVE
GFR SERPL CREATININE-BSD FRML MDRD: 104 ML/MIN/1.73SQ M
GLUCOSE SERPL-MCNC: 117 MG/DL (ref 65–140)
HCT VFR BLD AUTO: 42.7 % (ref 36.5–49.3)
HGB BLD-MCNC: 14.3 G/DL (ref 12–17)
IMM GRANULOCYTES # BLD AUTO: 0.1 THOUSAND/UL (ref 0–0.2)
IMM GRANULOCYTES NFR BLD AUTO: 1 % (ref 0–2)
LYMPHOCYTES # BLD AUTO: 1.76 THOUSANDS/ÂΜL (ref 0.6–4.47)
LYMPHOCYTES NFR BLD AUTO: 14 % (ref 14–44)
MCH RBC QN AUTO: 30.4 PG (ref 26.8–34.3)
MCHC RBC AUTO-ENTMCNC: 33.5 G/DL (ref 31.4–37.4)
MCV RBC AUTO: 91 FL (ref 82–98)
MONOCYTES # BLD AUTO: 1.24 THOUSAND/ÂΜL (ref 0.17–1.22)
MONOCYTES NFR BLD AUTO: 10 % (ref 4–12)
NEUTROPHILS # BLD AUTO: 9.27 THOUSANDS/ÂΜL (ref 1.85–7.62)
NEUTS SEG NFR BLD AUTO: 72 % (ref 43–75)
NRBC BLD AUTO-RTO: 0 /100 WBCS
PLATELET # BLD AUTO: 308 THOUSANDS/UL (ref 149–390)
PMV BLD AUTO: 9.1 FL (ref 8.9–12.7)
POTASSIUM SERPL-SCNC: 3.7 MMOL/L (ref 3.5–5.3)
PROT SERPL-MCNC: 6.8 G/DL (ref 6.4–8.4)
RBC # BLD AUTO: 4.71 MILLION/UL (ref 3.88–5.62)
RSV RNA RESP QL NAA+PROBE: NEGATIVE
SARS-COV-2 RNA RESP QL NAA+PROBE: NEGATIVE
SODIUM SERPL-SCNC: 138 MMOL/L (ref 135–147)
WBC # BLD AUTO: 12.64 THOUSAND/UL (ref 4.31–10.16)

## 2023-12-29 PROCEDURE — 85025 COMPLETE CBC W/AUTO DIFF WBC: CPT | Performed by: STUDENT IN AN ORGANIZED HEALTH CARE EDUCATION/TRAINING PROGRAM

## 2023-12-29 PROCEDURE — 80053 COMPREHEN METABOLIC PANEL: CPT | Performed by: STUDENT IN AN ORGANIZED HEALTH CARE EDUCATION/TRAINING PROGRAM

## 2023-12-29 PROCEDURE — 93005 ELECTROCARDIOGRAM TRACING: CPT

## 2023-12-29 PROCEDURE — 0241U HB NFCT DS VIR RESP RNA 4 TRGT: CPT | Performed by: STUDENT IN AN ORGANIZED HEALTH CARE EDUCATION/TRAINING PROGRAM

## 2023-12-29 PROCEDURE — 99285 EMERGENCY DEPT VISIT HI MDM: CPT | Performed by: STUDENT IN AN ORGANIZED HEALTH CARE EDUCATION/TRAINING PROGRAM

## 2023-12-29 PROCEDURE — 36415 COLL VENOUS BLD VENIPUNCTURE: CPT

## 2023-12-29 PROCEDURE — 71045 X-RAY EXAM CHEST 1 VIEW: CPT

## 2023-12-29 PROCEDURE — 84484 ASSAY OF TROPONIN QUANT: CPT | Performed by: STUDENT IN AN ORGANIZED HEALTH CARE EDUCATION/TRAINING PROGRAM

## 2023-12-30 NOTE — DISCHARGE INSTRUCTIONS
The laboratory/imaging studies, ECG that were obtained did not show any significant abnormalities.    You will be contacted with results of the respiratory viral panel.    Drink plenty of clear/hydrating fluids over the next few days.  Ibuprofen 600 mg every 6 hours and Tylenol 1000 mg every 6 hours recommended for fever/headache/muscle aches.    Return to the emergency department if your symptoms worsen.

## 2023-12-30 NOTE — ED PROVIDER NOTES
History  Chief Complaint   Patient presents with    Weakness - Generalized     Pt arrives from home with c/o hot flash followed by cold flash while at work. Pt reports feeling as though his heart rate was low at that time. C/o feeling generally weak at this time. Pt added he did not eat today, only had energy drink prior to this episode.        History provided by:  Patient  Fatigue  Severity:  Moderate  Onset quality:  Sudden  Duration:  3 hours  Timing:  Rare  Progression:  Improving  Chronicity:  New  Context: recent infection    Context comment:  Developed generalized weakness/fatigue while at work. Recent PNA. Denies CP/SOB, recent URI, fevers. Expresses intermittent chills, mild nausea. No known sick contacts. Did not receive flu/COVID vaccines. Recent Flu A infection.  Relieved by:  Nothing  Worsened by:  Nothing  Ineffective treatments:  None tried  Associated symptoms: lethargy and near-syncope    Associated symptoms: no abdominal pain, no arthralgias, no chest pain, no cough, no diarrhea, no dizziness, no dysuria, no fever, no frequency, no headaches, no melena, no myalgias, no nausea, no shortness of breath, no syncope, no urgency and no vomiting      Prior to Admission Medications   Prescriptions Last Dose Informant Patient Reported? Taking?   doxycycline hyclate (VIBRAMYCIN) 100 mg capsule   No No   Sig: Take 1 capsule (100 mg total) by mouth 2 (two) times a day for 10 days      Facility-Administered Medications: None       History reviewed. No pertinent past medical history.    Past Surgical History:   Procedure Laterality Date    HERNIA REPAIR         History reviewed. No pertinent family history.  I have reviewed and agree with the history as documented.    E-Cigarette/Vaping    E-Cigarette Use Never User      E-Cigarette/Vaping Substances     Social History     Tobacco Use    Smoking status: Every Day     Current packs/day: 0.50     Types: Cigarettes    Smokeless tobacco: Never   Vaping Use     Vaping status: Never Used   Substance Use Topics    Alcohol use: Not Currently    Drug use: Not Currently       Review of Systems   Constitutional:  Positive for fatigue. Negative for activity change, appetite change, chills and fever.   HENT:  Negative for congestion, rhinorrhea, sinus pressure and sinus pain.    Respiratory:  Negative for cough, chest tightness, shortness of breath and wheezing.    Cardiovascular:  Positive for near-syncope. Negative for chest pain, palpitations and syncope.   Gastrointestinal:  Negative for abdominal pain, diarrhea, melena, nausea and vomiting.   Genitourinary:  Negative for difficulty urinating, dysuria, flank pain, frequency and urgency.   Musculoskeletal:  Negative for arthralgias, back pain and myalgias.   Skin:  Negative for color change, pallor, rash and wound.   Neurological:  Negative for dizziness, syncope, light-headedness and headaches.   All other systems reviewed and are negative.      Physical Exam  Physical Exam  Vitals and nursing note reviewed.   Constitutional:       General: He is not in acute distress.     Appearance: He is not ill-appearing or toxic-appearing.   HENT:      Head: Normocephalic and atraumatic.      Right Ear: Tympanic membrane, ear canal and external ear normal.      Left Ear: Tympanic membrane, ear canal and external ear normal.      Nose: No congestion or rhinorrhea.      Mouth/Throat:      Mouth: Mucous membranes are moist.      Pharynx: Oropharynx is clear. No oropharyngeal exudate or posterior oropharyngeal erythema.   Eyes:      Extraocular Movements: Extraocular movements intact.      Pupils: Pupils are equal, round, and reactive to light.   Cardiovascular:      Rate and Rhythm: Normal rate and regular rhythm.      Pulses: Normal pulses.      Heart sounds: Normal heart sounds. No murmur heard.  Pulmonary:      Effort: Pulmonary effort is normal. No respiratory distress.      Breath sounds: Normal breath sounds. No stridor. No wheezing,  rhonchi or rales.   Chest:      Chest wall: No tenderness.   Abdominal:      General: Bowel sounds are normal.      Palpations: Abdomen is soft.      Tenderness: There is no abdominal tenderness. There is no guarding or rebound.   Musculoskeletal:      Cervical back: Neck supple. No tenderness.      Right lower leg: No edema.      Left lower leg: No edema.   Skin:     General: Skin is warm and dry.      Capillary Refill: Capillary refill takes less than 2 seconds.      Coloration: Skin is not jaundiced or pale.      Findings: No bruising, erythema, lesion or rash.   Neurological:      General: No focal deficit present.      Mental Status: He is alert and oriented to person, place, and time.   Psychiatric:         Mood and Affect: Mood normal.         Behavior: Behavior normal.       Vital Signs  ED Triage Vitals [12/29/23 1921]   Temperature Pulse Respirations Blood Pressure SpO2   98.1 °F (36.7 °C) 79 16 (!) 136/101 99 %      Temp Source Heart Rate Source Patient Position - Orthostatic VS BP Location FiO2 (%)   Temporal Monitor Sitting Right arm --      Pain Score       No Pain           Vitals:    12/29/23 1921 12/29/23 2103   BP: (!) 136/101 135/78   Pulse: 79 81   Patient Position - Orthostatic VS: Sitting Sitting         Visual Acuity      ED Medications  Medications - No data to display    Diagnostic Studies  Results Reviewed       Procedure Component Value Units Date/Time    FLU/RSV/COVID - if FLU/RSV clinically relevant [[id number]] Collected: 12/29/23 2126    Lab Status: In process Specimen: Nares from Nose Updated: 12/29/23 2128    HS Troponin 0hr (reflex protocol) [[id number]]  (Normal) Collected: 12/29/23 1932    Lab Status: Final result Specimen: Blood from Arm, Right Updated: 12/29/23 1959     hs TnI 0hr 6 ng/L     Comprehensive metabolic panel [[id number]] Collected: 12/29/23 1932    Lab Status: Final result Specimen: Blood from Arm, Right Updated: 12/29/23 1951     Sodium 138 mmol/L      Potassium 3.7  mmol/L      Chloride 108 mmol/L      CO2 27 mmol/L      ANION GAP 3 mmol/L      BUN 13 mg/dL      Creatinine 0.78 mg/dL      Glucose 117 mg/dL      Calcium 8.8 mg/dL      AST 19 U/L      ALT 26 U/L      Alkaline Phosphatase 78 U/L      Total Protein 6.8 g/dL      Albumin 4.0 g/dL      Total Bilirubin 0.69 mg/dL      eGFR 104 ml/min/1.73sq m     Narrative:      [employer] guidelines for Chronic Kidney Disease (CKD):     Stage 1 with normal or high GFR (GFR > 90 mL/min/1.73 square meters)    Stage 2 Mild CKD (GFR = 60-89 mL/min/1.73 square meters)    Stage 3A Moderate CKD (GFR = 45-59 mL/min/1.73 square meters)    Stage 3B Moderate CKD (GFR = 30-44 mL/min/1.73 square meters)    Stage 4 Severe CKD (GFR = 15-29 mL/min/1.73 square meters)    Stage 5 End Stage CKD (GFR <15 mL/min/1.73 square meters)  Note: GFR calculation is accurate only with a steady state creatinine    CBC and differential [[id number]]  (Abnormal) Collected: 12/29/23 1932    Lab Status: Final result Specimen: Blood from Arm, Right Updated: 12/29/23 1937     WBC 12.64 Thousand/uL      RBC 4.71 Million/uL      Hemoglobin 14.3 g/dL      Hematocrit 42.7 %      MCV 91 fL      MCH 30.4 pg      MCHC 33.5 g/dL      RDW 12.6 %      MPV 9.1 fL      Platelets 308 Thousands/uL      nRBC 0 /100 WBCs      Neutrophils Relative 72 %      Immat GRANS % 1 %      Lymphocytes Relative 14 %      Monocytes Relative 10 %      Eosinophils Relative 2 %      Basophils Relative 1 %      Neutrophils Absolute 9.27 Thousands/µL      Immature Grans Absolute 0.10 Thousand/uL      Lymphocytes Absolute 1.76 Thousands/µL      Monocytes Absolute 1.24 Thousand/µL      Eosinophils Absolute 0.21 Thousand/µL      Basophils Absolute 0.06 Thousands/µL                    XR chest 1 view portable   ED Interpretation by Luis Felipe Edmondson DO (12/29 2140)   No acute findings noted on chest x-ray                 Procedures  Procedures         ED Course  ED Course as of  12/29/23 2205   Fri Dec 29, 2023   2137 Mild/nonspecific leukocytosis.  Hemoglobin is within normal limits.  Normal platelet count.  No significant abnormalities noted on CMP.  Troponin negative.   2140 Chest x-ray without acute findings.   2143 ECG interpreted by me.  Sinus rhythm with sinus arrhythmia.  Heart rate 72 bpm.  Normal axis.  No acute ischemic changes.                               SBIRT 20yo+      Flowsheet Row Most Recent Value   Initial Alcohol Screen: US AUDIT-C     1. How often do you have a drink containing alcohol? 0 Filed at: 12/29/2023 2059   2. How many drinks containing alcohol do you have on a typical day you are drinking?  0 Filed at: 12/29/2023 2059   3a. Male UNDER 65: How often do you have five or more drinks on one occasion? 0 Filed at: 12/29/2023 2059   3b. FEMALE Any Age, or MALE 65+: How often do you have 4 or more drinks on one occassion? 0 Filed at: 12/29/2023 2059   Audit-C Score 0 Filed at: 12/29/2023 2059   REINALDO: How many times in the past year have you...    Used an illegal drug or used a prescription medication for non-medical reasons? Never Filed at: 12/29/2023 2059                      Medical Decision Making  The differential diagnoses include but are not limited to viral syndrome, pneumonia, dehydration, ACS, PE  Vital signs reviewed.  No signs of distress.  ECG without acute ischemic changes.  Troponin negative.  Denies chest pain/shortness of breath.  ACS unlikely.  Laboratory/imaging interpretation above. No signs of PNA. No clear source of symptoms. RVP is pending. The patient will be contacted with the results.  Recommendation/return precautions were discussed.  All questions addressed.  Stable for discharge.    Problems Addressed:  Generalized weakness: acute illness or injury    Amount and/or Complexity of Data Reviewed  External Data Reviewed: labs and notes.  Labs: ordered. Decision-making details documented in ED Course.  Radiology: ordered and independent  interpretation performed. Decision-making details documented in ED Course.  ECG/medicine tests: ordered and independent interpretation performed. Decision-making details documented in ED Course.             Disposition  Final diagnoses:   Generalized weakness     Time reflects when diagnosis was documented in both MDM as applicable and the Disposition within this note       Time User Action Codes Description Comment    12/29/2023  9:44 PM Luis Felipe Edmondson Add [R53.1] Generalized weakness           ED Disposition       ED Disposition   Discharge    Condition   Stable    Date/Time   Fri Dec 29, 2023 2144    Comment   Conor Greenberg discharge to home/self care.                   Follow-up Information    None         Discharge Medication List as of 12/29/2023  9:45 PM        CONTINUE these medications which have NOT CHANGED    Details   doxycycline hyclate (VIBRAMYCIN) 100 mg capsule Take 1 capsule (100 mg total) by mouth 2 (two) times a day for 10 days, Starting Tue 12/19/2023, Until Fri 12/29/2023, Normal             No discharge procedures on file.    PDMP Review       None            ED Provider  Electronically Signed by             Luis Felipe Edmondson DO  12/29/23 0994

## 2023-12-31 LAB
ATRIAL RATE: 72 BPM
P AXIS: 73 DEGREES
PR INTERVAL: 166 MS
QRS AXIS: 63 DEGREES
QRSD INTERVAL: 100 MS
QT INTERVAL: 380 MS
QTC INTERVAL: 416 MS
T WAVE AXIS: 73 DEGREES
VENTRICULAR RATE: 72 BPM